# Patient Record
Sex: MALE | Employment: OTHER | URBAN - METROPOLITAN AREA
[De-identification: names, ages, dates, MRNs, and addresses within clinical notes are randomized per-mention and may not be internally consistent; named-entity substitution may affect disease eponyms.]

---

## 2022-11-14 ENCOUNTER — LAB ENCOUNTER (OUTPATIENT)
Dept: LAB | Facility: REFERENCE LAB | Age: 32
End: 2022-11-14
Attending: INTERNAL MEDICINE
Payer: COMMERCIAL

## 2022-11-14 ENCOUNTER — OFFICE VISIT (OUTPATIENT)
Dept: INTERNAL MEDICINE CLINIC | Facility: CLINIC | Age: 32
End: 2022-11-14
Payer: COMMERCIAL

## 2022-11-14 VITALS
SYSTOLIC BLOOD PRESSURE: 110 MMHG | BODY MASS INDEX: 41.28 KG/M2 | DIASTOLIC BLOOD PRESSURE: 60 MMHG | TEMPERATURE: 99 F | OXYGEN SATURATION: 98 % | WEIGHT: 263 LBS | HEIGHT: 66.75 IN | HEART RATE: 70 BPM

## 2022-11-14 DIAGNOSIS — G62.9 NEUROPATHY: ICD-10-CM

## 2022-11-14 DIAGNOSIS — Z00.00 ANNUAL PHYSICAL EXAM: Primary | ICD-10-CM

## 2022-11-14 DIAGNOSIS — Z11.59 NEED FOR HEPATITIS C SCREENING TEST: ICD-10-CM

## 2022-11-14 DIAGNOSIS — Z13.1 DIABETES MELLITUS SCREENING: ICD-10-CM

## 2022-11-14 LAB
ALBUMIN SERPL-MCNC: 3.8 G/DL (ref 3.4–5)
ALBUMIN/GLOB SERPL: 0.9 {RATIO} (ref 1–2)
ALP LIVER SERPL-CCNC: 93 U/L
ALT SERPL-CCNC: 36 U/L
ANION GAP SERPL CALC-SCNC: 9 MMOL/L (ref 0–18)
AST SERPL-CCNC: 14 U/L (ref 15–37)
BASOPHILS # BLD AUTO: 0.05 X10(3) UL (ref 0–0.2)
BASOPHILS NFR BLD AUTO: 0.5 %
BILIRUB SERPL-MCNC: 0.4 MG/DL (ref 0.1–2)
BUN BLD-MCNC: 16 MG/DL (ref 7–18)
BUN/CREAT SERPL: 15.7 (ref 10–20)
CALCIUM BLD-MCNC: 9.9 MG/DL (ref 8.5–10.1)
CHLORIDE SERPL-SCNC: 106 MMOL/L (ref 98–112)
CO2 SERPL-SCNC: 26 MMOL/L (ref 21–32)
CREAT BLD-MCNC: 1.02 MG/DL
DEPRECATED RDW RBC AUTO: 39.2 FL (ref 35.1–46.3)
EOSINOPHIL # BLD AUTO: 0.19 X10(3) UL (ref 0–0.7)
EOSINOPHIL NFR BLD AUTO: 2 %
ERYTHROCYTE [DISTWIDTH] IN BLOOD BY AUTOMATED COUNT: 11.9 % (ref 11–15)
EST. AVERAGE GLUCOSE BLD GHB EST-MCNC: 97 MG/DL (ref 68–126)
FASTING STATUS PATIENT QL REPORTED: NO
GFR SERPLBLD BASED ON 1.73 SQ M-ARVRAT: 101 ML/MIN/1.73M2 (ref 60–?)
GLOBULIN PLAS-MCNC: 4.3 G/DL (ref 2.8–4.4)
GLUCOSE BLD-MCNC: 89 MG/DL (ref 70–99)
HBA1C MFR BLD: 5 % (ref ?–5.7)
HCT VFR BLD AUTO: 44 %
HCV AB SERPL QL IA: NONREACTIVE
HGB BLD-MCNC: 14.7 G/DL
IMM GRANULOCYTES # BLD AUTO: 0.04 X10(3) UL (ref 0–1)
IMM GRANULOCYTES NFR BLD: 0.4 %
LYMPHOCYTES # BLD AUTO: 2.46 X10(3) UL (ref 1–4)
LYMPHOCYTES NFR BLD AUTO: 26.3 %
MCH RBC QN AUTO: 29.8 PG (ref 26–34)
MCHC RBC AUTO-ENTMCNC: 33.4 G/DL (ref 31–37)
MCV RBC AUTO: 89.2 FL
MONOCYTES # BLD AUTO: 0.65 X10(3) UL (ref 0.1–1)
MONOCYTES NFR BLD AUTO: 7 %
NEUTROPHILS # BLD AUTO: 5.95 X10 (3) UL (ref 1.5–7.7)
NEUTROPHILS # BLD AUTO: 5.95 X10(3) UL (ref 1.5–7.7)
NEUTROPHILS NFR BLD AUTO: 63.8 %
OSMOLALITY SERPL CALC.SUM OF ELEC: 293 MOSM/KG (ref 275–295)
PLATELET # BLD AUTO: 322 10(3)UL (ref 150–450)
POTASSIUM SERPL-SCNC: 4.1 MMOL/L (ref 3.5–5.1)
PROT SERPL-MCNC: 8.1 G/DL (ref 6.4–8.2)
RBC # BLD AUTO: 4.93 X10(6)UL
SODIUM SERPL-SCNC: 141 MMOL/L (ref 136–145)
T4 FREE SERPL-MCNC: 0.9 NG/DL (ref 0.8–1.7)
TSI SER-ACNC: 1.23 MIU/ML (ref 0.36–3.74)
WBC # BLD AUTO: 9.3 X10(3) UL (ref 4–11)

## 2022-11-14 PROCEDURE — 86803 HEPATITIS C AB TEST: CPT | Performed by: INTERNAL MEDICINE

## 2022-11-14 PROCEDURE — 80053 COMPREHEN METABOLIC PANEL: CPT | Performed by: INTERNAL MEDICINE

## 2022-11-14 PROCEDURE — 3074F SYST BP LT 130 MM HG: CPT | Performed by: INTERNAL MEDICINE

## 2022-11-14 PROCEDURE — 84443 ASSAY THYROID STIM HORMONE: CPT

## 2022-11-14 PROCEDURE — 36415 COLL VENOUS BLD VENIPUNCTURE: CPT | Performed by: INTERNAL MEDICINE

## 2022-11-14 PROCEDURE — 99385 PREV VISIT NEW AGE 18-39: CPT | Performed by: INTERNAL MEDICINE

## 2022-11-14 PROCEDURE — 84439 ASSAY OF FREE THYROXINE: CPT

## 2022-11-14 PROCEDURE — 3078F DIAST BP <80 MM HG: CPT | Performed by: INTERNAL MEDICINE

## 2022-11-14 PROCEDURE — 83036 HEMOGLOBIN GLYCOSYLATED A1C: CPT | Performed by: INTERNAL MEDICINE

## 2022-11-14 PROCEDURE — 3008F BODY MASS INDEX DOCD: CPT | Performed by: INTERNAL MEDICINE

## 2022-11-14 PROCEDURE — 85025 COMPLETE CBC W/AUTO DIFF WBC: CPT | Performed by: INTERNAL MEDICINE

## 2022-11-16 ENCOUNTER — LAB ENCOUNTER (OUTPATIENT)
Dept: LAB | Facility: HOSPITAL | Age: 32
End: 2022-11-16
Attending: INTERNAL MEDICINE
Payer: COMMERCIAL

## 2022-11-16 LAB
CHOLEST SERPL-MCNC: 190 MG/DL (ref ?–200)
FASTING PATIENT LIPID ANSWER: YES
HDLC SERPL-MCNC: 63 MG/DL (ref 40–59)
LDLC SERPL CALC-MCNC: 111 MG/DL (ref ?–100)
NONHDLC SERPL-MCNC: 127 MG/DL (ref ?–130)
TRIGL SERPL-MCNC: 91 MG/DL (ref 30–149)
VLDLC SERPL CALC-MCNC: 16 MG/DL (ref 0–30)

## 2022-11-16 PROCEDURE — 36415 COLL VENOUS BLD VENIPUNCTURE: CPT | Performed by: INTERNAL MEDICINE

## 2022-11-16 PROCEDURE — 80061 LIPID PANEL: CPT | Performed by: INTERNAL MEDICINE

## 2024-02-13 ENCOUNTER — OFFICE VISIT (OUTPATIENT)
Dept: INTERNAL MEDICINE CLINIC | Facility: CLINIC | Age: 34
End: 2024-02-13
Payer: COMMERCIAL

## 2024-02-13 VITALS
SYSTOLIC BLOOD PRESSURE: 110 MMHG | HEIGHT: 66 IN | OXYGEN SATURATION: 97 % | HEART RATE: 94 BPM | TEMPERATURE: 99 F | BODY MASS INDEX: 40.66 KG/M2 | DIASTOLIC BLOOD PRESSURE: 60 MMHG | WEIGHT: 253 LBS

## 2024-02-13 DIAGNOSIS — Z00.00 ANNUAL PHYSICAL EXAM: Primary | ICD-10-CM

## 2024-02-13 DIAGNOSIS — Z13.21 ENCOUNTER FOR VITAMIN DEFICIENCY SCREENING: ICD-10-CM

## 2024-02-13 DIAGNOSIS — R63.5 WEIGHT GAIN: ICD-10-CM

## 2024-02-13 PROCEDURE — 3078F DIAST BP <80 MM HG: CPT | Performed by: INTERNAL MEDICINE

## 2024-02-13 PROCEDURE — 99213 OFFICE O/P EST LOW 20 MIN: CPT | Performed by: INTERNAL MEDICINE

## 2024-02-13 PROCEDURE — 3008F BODY MASS INDEX DOCD: CPT | Performed by: INTERNAL MEDICINE

## 2024-02-13 PROCEDURE — 3074F SYST BP LT 130 MM HG: CPT | Performed by: INTERNAL MEDICINE

## 2024-02-13 PROCEDURE — 99395 PREV VISIT EST AGE 18-39: CPT | Performed by: INTERNAL MEDICINE

## 2024-02-13 RX ORDER — TIRZEPATIDE 2.5 MG/.5ML
2.5 INJECTION, SOLUTION SUBCUTANEOUS WEEKLY
Qty: 2 ML | Refills: 0 | Status: SHIPPED | OUTPATIENT
Start: 2024-02-13

## 2024-02-14 ENCOUNTER — LAB ENCOUNTER (OUTPATIENT)
Dept: LAB | Facility: HOSPITAL | Age: 34
End: 2024-02-14
Attending: INTERNAL MEDICINE
Payer: COMMERCIAL

## 2024-02-14 DIAGNOSIS — Z13.21 ENCOUNTER FOR VITAMIN DEFICIENCY SCREENING: ICD-10-CM

## 2024-02-14 LAB
ALBUMIN SERPL-MCNC: 4.7 G/DL (ref 3.2–4.8)
ALBUMIN/GLOB SERPL: 1.5 {RATIO} (ref 1–2)
ALP LIVER SERPL-CCNC: 88 U/L
ALT SERPL-CCNC: 19 U/L
ANION GAP SERPL CALC-SCNC: 8 MMOL/L (ref 0–18)
AST SERPL-CCNC: 20 U/L (ref ?–34)
BASOPHILS # BLD AUTO: 0.05 X10(3) UL (ref 0–0.2)
BASOPHILS NFR BLD AUTO: 0.5 %
BILIRUB SERPL-MCNC: 1.1 MG/DL (ref 0.3–1.2)
BUN BLD-MCNC: 14 MG/DL (ref 9–23)
BUN/CREAT SERPL: 13.9 (ref 10–20)
CALCIUM BLD-MCNC: 9.5 MG/DL (ref 8.7–10.4)
CHLORIDE SERPL-SCNC: 104 MMOL/L (ref 98–112)
CHOLEST SERPL-MCNC: 189 MG/DL (ref ?–200)
CO2 SERPL-SCNC: 26 MMOL/L (ref 21–32)
CREAT BLD-MCNC: 1.01 MG/DL
DEPRECATED RDW RBC AUTO: 38.6 FL (ref 35.1–46.3)
EGFRCR SERPLBLD CKD-EPI 2021: 102 ML/MIN/1.73M2 (ref 60–?)
EOSINOPHIL # BLD AUTO: 0.1 X10(3) UL (ref 0–0.7)
EOSINOPHIL NFR BLD AUTO: 1 %
ERYTHROCYTE [DISTWIDTH] IN BLOOD BY AUTOMATED COUNT: 11.9 % (ref 11–15)
EST. AVERAGE GLUCOSE BLD GHB EST-MCNC: 91 MG/DL (ref 68–126)
FASTING PATIENT LIPID ANSWER: YES
FASTING STATUS PATIENT QL REPORTED: YES
GLOBULIN PLAS-MCNC: 3.2 G/DL (ref 2.8–4.4)
GLUCOSE BLD-MCNC: 79 MG/DL (ref 70–99)
HBA1C MFR BLD: 4.8 % (ref ?–5.7)
HCT VFR BLD AUTO: 41.6 %
HDLC SERPL-MCNC: 66 MG/DL (ref 40–59)
HGB BLD-MCNC: 14.3 G/DL
IMM GRANULOCYTES # BLD AUTO: 0.04 X10(3) UL (ref 0–1)
IMM GRANULOCYTES NFR BLD: 0.4 %
LDLC SERPL CALC-MCNC: 107 MG/DL (ref ?–100)
LYMPHOCYTES # BLD AUTO: 2.44 X10(3) UL (ref 1–4)
LYMPHOCYTES NFR BLD AUTO: 25.2 %
MCH RBC QN AUTO: 30 PG (ref 26–34)
MCHC RBC AUTO-ENTMCNC: 34.4 G/DL (ref 31–37)
MCV RBC AUTO: 87.2 FL
MONOCYTES # BLD AUTO: 0.58 X10(3) UL (ref 0.1–1)
MONOCYTES NFR BLD AUTO: 6 %
NEUTROPHILS # BLD AUTO: 6.47 X10 (3) UL (ref 1.5–7.7)
NEUTROPHILS # BLD AUTO: 6.47 X10(3) UL (ref 1.5–7.7)
NEUTROPHILS NFR BLD AUTO: 66.9 %
NONHDLC SERPL-MCNC: 123 MG/DL (ref ?–130)
OSMOLALITY SERPL CALC.SUM OF ELEC: 285 MOSM/KG (ref 275–295)
PLATELET # BLD AUTO: 307 10(3)UL (ref 150–450)
POTASSIUM SERPL-SCNC: 3.8 MMOL/L (ref 3.5–5.1)
PROT SERPL-MCNC: 7.9 G/DL (ref 5.7–8.2)
RBC # BLD AUTO: 4.77 X10(6)UL
SODIUM SERPL-SCNC: 138 MMOL/L (ref 136–145)
TRIGL SERPL-MCNC: 87 MG/DL (ref 30–149)
TSI SER-ACNC: 1.79 MIU/ML (ref 0.55–4.78)
VIT D+METAB SERPL-MCNC: 7.7 NG/ML (ref 30–100)
VLDLC SERPL CALC-MCNC: 15 MG/DL (ref 0–30)
WBC # BLD AUTO: 9.7 X10(3) UL (ref 4–11)

## 2024-02-14 PROCEDURE — 36415 COLL VENOUS BLD VENIPUNCTURE: CPT | Performed by: INTERNAL MEDICINE

## 2024-02-14 PROCEDURE — 80053 COMPREHEN METABOLIC PANEL: CPT | Performed by: INTERNAL MEDICINE

## 2024-02-14 PROCEDURE — 80061 LIPID PANEL: CPT | Performed by: INTERNAL MEDICINE

## 2024-02-14 PROCEDURE — 85025 COMPLETE CBC W/AUTO DIFF WBC: CPT | Performed by: INTERNAL MEDICINE

## 2024-02-14 PROCEDURE — 82306 VITAMIN D 25 HYDROXY: CPT

## 2024-02-14 PROCEDURE — 83036 HEMOGLOBIN GLYCOSYLATED A1C: CPT | Performed by: INTERNAL MEDICINE

## 2024-02-14 PROCEDURE — 84443 ASSAY THYROID STIM HORMONE: CPT | Performed by: INTERNAL MEDICINE

## 2024-02-16 ENCOUNTER — TELEPHONE (OUTPATIENT)
Dept: INTERNAL MEDICINE CLINIC | Facility: CLINIC | Age: 34
End: 2024-02-16

## 2024-02-16 NOTE — TELEPHONE ENCOUNTER
Patient is calling stating the pharmacy told him that the medication Tirzepatide-Weight Management (ZEPBOUND) 2.5 MG/0.5ML Subcutaneous Solution Auto-injector  needs a prior authorization.       Please advice

## 2024-02-19 NOTE — TELEPHONE ENCOUNTER
PA started on cover my meds for zepbound. Key: [id number]. Case saved, not yet submitted.     Routed to MD - if pt has tried and failed anything treatment/meds for weight loss.

## 2024-02-20 ENCOUNTER — TELEPHONE (OUTPATIENT)
Dept: INTERNAL MEDICINE CLINIC | Facility: CLINIC | Age: 34
End: 2024-02-20

## 2024-02-20 NOTE — TELEPHONE ENCOUNTER
Fax received from [employer] re: Denial of Zepbound Rx for weight loss. Denial notice states this PPO plan does not cover weight loss drugs.     Denial routed to Dr Lowry.    ePantry message sent to patient to inform him that GLP-1 category drugs like Zepbound are not covered for weight loss or non-diabetics. His last A1C: 4.8%

## 2024-02-22 RX ORDER — VITAMIN B COMPLEX
1 TABLET ORAL DAILY
Qty: 90 TABLET | Refills: 1 | Status: SHIPPED | OUTPATIENT
Start: 2024-02-22 | End: 2024-03-23

## 2024-02-22 RX ORDER — PHENTERMINE HYDROCHLORIDE 37.5 MG/1
37.5 TABLET ORAL
Qty: 90 TABLET | Refills: 0 | Status: SHIPPED | OUTPATIENT
Start: 2024-02-22 | End: 2024-02-22 | Stop reason: CLARIF

## 2024-02-22 NOTE — PROGRESS NOTES
[facility]  Return Patient      HPI:     Chief Complaint   Patient presents with    Physical       Pavel PRAMOD Benavidez is a 33 year old male presenting for:  Follow up and annual.  e Has  has no past medical history on file.     He is here to discuss preventative work and weight gain.      He is working out via lifting weights but still feels that his weight is not decreasing.  Would like to discuss options.      Results for orders placed or performed in visit on 02/13/24   Lipid Panel [E]   Result Value Ref Range    Cholesterol, Total 189 <200 mg/dL    HDL Cholesterol 66 (H) 40 - 59 mg/dL    Triglycerides 87 30 - 149 mg/dL    LDL Cholesterol 107 (H) <100 mg/dL    VLDL 15 0 - 30 mg/dL    Non HDL Chol 123 <130 mg/dL    Patient Fasting for Lipid? Yes    Comp Metabolic Panel (14) [E]   Result Value Ref Range    Glucose 79 70 - 99 mg/dL    Sodium 138 136 - 145 mmol/L    Potassium 3.8 3.5 - 5.1 mmol/L    Chloride 104 98 - 112 mmol/L    CO2 26.0 21.0 - 32.0 mmol/L    Anion Gap 8 0 - 18 mmol/L    BUN 14 9 - 23 mg/dL    Creatinine 1.01 0.70 - 1.30 mg/dL    BUN/CREA Ratio 13.9 10.0 - 20.0    Calcium, Total 9.5 8.7 - 10.4 mg/dL    Calculated Osmolality 285 275 - 295 mOsm/kg    eGFR-Cr 102 >=60 mL/min/1.73m2    ALT 19 10 - 49 U/L    AST 20 <=34 U/L    Alkaline Phosphatase 88 45 - 117 U/L    Bilirubin, Total 1.1 0.3 - 1.2 mg/dL    Total Protein 7.9 5.7 - 8.2 g/dL    Albumin 4.7 3.2 - 4.8 g/dL    Globulin  3.2 2.8 - 4.4 g/dL    A/G Ratio 1.5 1.0 - 2.0    Patient Fasting for CMP? Yes    Hemoglobin A1C (Glycohemoglobin) [E]   Result Value Ref Range    HgbA1C 4.8 <5.7 %    Estimated Average Glucose 91 68 - 126 mg/dL   TSH W Reflex To Free T4 [E]   Result Value Ref Range    TSH 1.787 0.550 - 4.780 mIU/mL   CBC W/ DIFFERENTIAL   Result Value Ref Range    WBC 9.7 4.0 - 11.0 x10(3) uL    RBC 4.77 4.30 - 5.70 x10(6)uL    HGB 14.3 13.0 - 17.5 g/dL    HCT 41.6 39.0 - 53.0 %    MCV 87.2 80.0 - 100.0 fL    MCH 30.0 26.0 -  34.0 pg    MCHC 34.4 31.0 - 37.0 g/dL    RDW-SD 38.6 35.1 - 46.3 fL    RDW 11.9 11.0 - 15.0 %    .0 150.0 - 450.0 10(3)uL    Neutrophil Absolute Prelim 6.47 1.50 - 7.70 x10 (3) uL    Neutrophil Absolute 6.47 1.50 - 7.70 x10(3) uL    Lymphocyte Absolute 2.44 1.00 - 4.00 x10(3) uL    Monocyte Absolute 0.58 0.10 - 1.00 x10(3) uL    Eosinophil Absolute 0.10 0.00 - 0.70 x10(3) uL    Basophil Absolute 0.05 0.00 - 0.20 x10(3) uL    Immature Granulocyte Absolute 0.04 0.00 - 1.00 x10(3) uL    Neutrophil % 66.9 %    Lymphocyte % 25.2 %    Monocyte % 6.0 %    Eosinophil % 1.0 %    Basophil % 0.5 %    Immature Granulocyte % 0.4 %             Labs:   Complete Metabolic Panel:  Lab Results   Component Value Date/Time     02/14/2024 12:06 PM    K 3.8 02/14/2024 12:06 PM     02/14/2024 12:06 PM    CO2 26.0 02/14/2024 12:06 PM    CREATSERUM 1.01 02/14/2024 12:06 PM    CA 9.5 02/14/2024 12:06 PM    GLU 79 02/14/2024 12:06 PM    TP 7.9 02/14/2024 12:06 PM    ALB 4.7 02/14/2024 12:06 PM    ALKPHO 88 02/14/2024 12:06 PM    AST 20 02/14/2024 12:06 PM    ALT 19 02/14/2024 12:06 PM    BILT 1.1 02/14/2024 12:06 PM    TSH 1.787 02/14/2024 12:06 PM    T4F 0.9 11/14/2022 02:28 PM        Hemoglobin A1C, Microalbumin  Lab Results   Component Value Date/Time    A1C 4.8 02/14/2024 12:06 PM        Lipid panel  Lab Results   Component Value Date/Time    CHOLEST 189 02/14/2024 12:06 PM    HDL 66 (H) 02/14/2024 12:06 PM    TRIG 87 02/14/2024 12:06 PM     (H) 02/14/2024 12:06 PM    NONHDLC 123 02/14/2024 12:06 PM     The ASCVD Risk score (Sury ABARCA, et al., 2019) failed to calculate for the following reasons:    The 2019 ASCVD risk score is only valid for ages 40 to 79       Medications:  Current Outpatient Medications   Medication Sig Dispense Refill    Tirzepatide-Weight Management (ZEPBOUND) 2.5 MG/0.5ML Subcutaneous Solution Auto-injector Inject 2.5 mg into the skin once a week. 2 mL 0      PMH:  History reviewed. No  pertinent past medical history.      PSH:  History reviewed. No pertinent surgical history.    Allergies:  No Known Allergies   Social History:  Social History     Socioeconomic History    Marital status:    Tobacco Use    Smoking status: Never    Smokeless tobacco: Never   Substance and Sexual Activity    Alcohol use: Yes     Comment: social    Drug use: Never        Family History:  History reviewed. No pertinent family history.       REVIEW OF SYSTEMS:   Review of Systems   Constitutional:  Negative for chills, fatigue, fever and unexpected weight change.   HENT:  Negative for congestion, ear pain, hearing loss, rhinorrhea, sinus pain and sore throat.    Eyes:  Negative for pain, redness and visual disturbance.   Respiratory:  Negative for apnea, cough, chest tightness, shortness of breath and wheezing.    Cardiovascular:  Negative for chest pain, palpitations and leg swelling.   Gastrointestinal:  Negative for abdominal distention, abdominal pain, blood in stool, constipation and nausea.   Endocrine: Negative for cold intolerance, heat intolerance and polyuria.   Genitourinary:  Negative for dysuria, hematuria and urgency.   Musculoskeletal:  Negative for arthralgias, back pain, gait problem, joint swelling, myalgias and neck pain.   Skin:  Negative for rash and wound.   Allergic/Immunologic: Negative for food allergies and immunocompromised state.   Neurological:  Negative for dizziness, seizures, facial asymmetry, speech difficulty, weakness, light-headedness, numbness and headaches.   Hematological:  Negative for adenopathy. Does not bruise/bleed easily.   Psychiatric/Behavioral:  Negative for behavioral problems, sleep disturbance and suicidal ideas. The patient is not nervous/anxious.             PHYSICAL EXAM:   /60 (BP Location: Right arm, Patient Position: Sitting, Cuff Size: adult)   Pulse 94   Temp 98.6 °F (37 °C) (Temporal)   Ht 5' 6\" (1.676 m)   Wt 253 lb (114.8 kg)   SpO2 97%    BMI 40.84 kg/m²  Estimated body mass index is 40.84 kg/m² as calculated from the following:    Height as of this encounter: 5' 6\" (1.676 m).    Weight as of this encounter: 253 lb (114.8 kg).     Wt Readings from Last 3 Encounters:   02/13/24 253 lb (114.8 kg)   11/14/22 263 lb (119.3 kg)       Physical Exam  Vitals reviewed.   Constitutional:       General: He is not in acute distress.     Appearance: He is well-developed.   HENT:      Head: Normocephalic and atraumatic.   Eyes:      Conjunctiva/sclera: Conjunctivae normal.      Pupils: Pupils are equal, round, and reactive to light.   Neck:      Thyroid: No thyromegaly.   Cardiovascular:      Rate and Rhythm: Normal rate and regular rhythm.      Heart sounds: Normal heart sounds, S1 normal and S2 normal. No murmur heard.     No friction rub. No gallop.   Pulmonary:      Effort: Pulmonary effort is normal. No respiratory distress.      Breath sounds: Normal breath sounds. No wheezing or rales.   Chest:      Chest wall: No tenderness.   Abdominal:      General: Bowel sounds are normal. There is no distension.      Palpations: Abdomen is soft. There is no mass.      Tenderness: There is no abdominal tenderness. There is no guarding or rebound.   Musculoskeletal:         General: No tenderness. Normal range of motion.      Cervical back: Normal range of motion.   Lymphadenopathy:      Cervical: No cervical adenopathy.   Skin:     General: Skin is warm.      Findings: No erythema or rash.   Neurological:      Mental Status: He is alert and oriented to person, place, and time.      Cranial Nerves: No cranial nerve deficit.      Deep Tendon Reflexes: Reflexes are normal and symmetric.   Psychiatric:         Behavior: Behavior normal.         Thought Content: Thought content normal.         Judgment: Judgment normal.             ASSESSMENT AND PLAN:   Patient is a 33 year old male who presents primarily presents for:    (Z00.00) Annual physical exam  (primary encounter  diagnosis)  Plan: Lipid Panel [E], Comp Metabolic Panel (14) [E],        CBC With Differential With Platelet          During the annual physical exam I spent extensive time discussing medical history including existing conditions, past surgeries, allergies and medications.  Lifestyle factors discussed including diet, exercise and substance abuse including alcohol and tobacco if warranted.  Counseled on screening tests based on age and underlying risk factors which may include but not limited to blood pressure measurement, cholesterol screening, diabetes screening and prostate cancer screening.  Preventative screenings discussed.  Reviewed immunizations.  Sexual health discussed if appropriate.  Family history reviewed.  I addressed any specific concerns or symptoms that the patient had.        (R63.5) Weight gain  Plan: Hemoglobin A1C (Glycohemoglobin) [E], TSH W         Reflex To Free T4 [E],    Discussed options.  Counseled on diet.  Will try to see if tirzepatide is covered.              (Z13.21) Encounter for vitamin deficiency screening  Plan: Vitamin D [E]                        Health Maintenance:  Annual Depression Screen Never done  Annual Physical Never done  COVID-19 Vaccine(1) Never done  DTaP,Tdap,and Td Vaccines(1 - Tdap) Never done  Influenza Vaccine(1) Never done  Pneumococcal Vaccine: Birth to 64yrs Aged Out          Meds & Refills for this Visit:  Requested Prescriptions     Signed Prescriptions Disp Refills    Tirzepatide-Weight Management (ZEPBOUND) 2.5 MG/0.5ML Subcutaneous Solution Auto-injector 2 mL 0     Sig: Inject 2.5 mg into the skin once a week.       Orders Placed This Encounter   Procedures    Lipid Panel [E]    Comp Metabolic Panel (14) [E]    CBC With Differential With Platelet    Hemoglobin A1C (Glycohemoglobin) [E]    TSH W Reflex To Free T4 [E]    Vitamin D [E]       Imaging & Consults:  None        Bernard Lowry MD     Return in about 3 months (around 5/13/2024) for Weight  Monitoring.  Important issues to follow up on next visit      Patient indicates understanding of the above recommendations and agrees to the above plan.  Reasurrance and education provided. All questions answered.  Notified to call with any questions, complications, allergies, or worsening or changing symptoms as well as any side effects or complications from the treatments .  Red flags/ ER precautions discussed.    Total time spent was 25 minutes in addition to annual physical addressing weight loss concerns as noted above.       Bernard Lowry MD  Internal Medicine/Primary Care  [facility]

## 2024-05-09 ENCOUNTER — OFFICE VISIT (OUTPATIENT)
Dept: INTERNAL MEDICINE CLINIC | Facility: CLINIC | Age: 34
End: 2024-05-09

## 2024-05-09 VITALS
DIASTOLIC BLOOD PRESSURE: 78 MMHG | WEIGHT: 228.81 LBS | SYSTOLIC BLOOD PRESSURE: 114 MMHG | BODY MASS INDEX: 36.77 KG/M2 | HEIGHT: 66 IN | HEART RATE: 82 BPM | TEMPERATURE: 97 F | OXYGEN SATURATION: 99 %

## 2024-05-09 DIAGNOSIS — Z71.3 WEIGHT LOSS COUNSELING, ENCOUNTER FOR: Primary | ICD-10-CM

## 2024-05-09 PROCEDURE — 99214 OFFICE O/P EST MOD 30 MIN: CPT | Performed by: INTERNAL MEDICINE

## 2024-05-09 PROCEDURE — 3008F BODY MASS INDEX DOCD: CPT | Performed by: INTERNAL MEDICINE

## 2024-05-09 PROCEDURE — 3074F SYST BP LT 130 MM HG: CPT | Performed by: INTERNAL MEDICINE

## 2024-05-09 PROCEDURE — 3078F DIAST BP <80 MM HG: CPT | Performed by: INTERNAL MEDICINE

## 2024-05-09 RX ORDER — PHENTERMINE HYDROCHLORIDE 37.5 MG/1
TABLET ORAL
COMMUNITY
Start: 2024-02-22

## 2024-05-09 RX ORDER — PHENTERMINE HYDROCHLORIDE 37.5 MG/1
37.5 TABLET ORAL
Qty: 90 TABLET | Refills: 1 | Status: SHIPPED | OUTPATIENT
Start: 2024-05-09

## 2024-05-20 NOTE — PROGRESS NOTES
[facility]  Return Patient      HPI:     Chief Complaint   Patient presents with    Follow - Up    Allied Health Visit       Pavel Benavidez is a 33 year old male presenting for:  Follow up and annual.  e Has  has no past medical history on file.     He is here to discuss preventative work and weight gain.      Continues on phentermine.  Overall is feeling well.          Results for orders placed or performed in visit on 02/14/24   Vitamin D, 25-Hydroxy   Result Value Ref Range    Vitamin D, 25OH, Total 7.7 (L) 30.0 - 100.0 ng/mL             Labs:   Complete Metabolic Panel:  Lab Results   Component Value Date/Time     02/14/2024 12:06 PM    K 3.8 02/14/2024 12:06 PM     02/14/2024 12:06 PM    CO2 26.0 02/14/2024 12:06 PM    CREATSERUM 1.01 02/14/2024 12:06 PM    CA 9.5 02/14/2024 12:06 PM    GLU 79 02/14/2024 12:06 PM    TP 7.9 02/14/2024 12:06 PM    ALB 4.7 02/14/2024 12:06 PM    ALKPHO 88 02/14/2024 12:06 PM    AST 20 02/14/2024 12:06 PM    ALT 19 02/14/2024 12:06 PM    BILT 1.1 02/14/2024 12:06 PM    TSH 1.787 02/14/2024 12:06 PM    T4F 0.9 11/14/2022 02:28 PM        Hemoglobin A1C, Microalbumin  Lab Results   Component Value Date/Time    A1C 4.8 02/14/2024 12:06 PM        Lipid panel  Lab Results   Component Value Date/Time    CHOLEST 189 02/14/2024 12:06 PM    HDL 66 (H) 02/14/2024 12:06 PM    TRIG 87 02/14/2024 12:06 PM     (H) 02/14/2024 12:06 PM    NONHDLC 123 02/14/2024 12:06 PM     The ASCVD Risk score (Sury ABARCA, et al., 2019) failed to calculate for the following reasons:    The 2019 ASCVD risk score is only valid for ages 40 to 79       Medications:  Current Outpatient Medications   Medication Sig Dispense Refill    Phentermine HCl 37.5 MG Oral Tab       Phentermine HCl 37.5 MG Oral Tab Take 1 tablet (37.5 mg total) by mouth every morning before breakfast. 90 tablet 1      PMH:  History reviewed. No pertinent past medical history.      PSH:  History reviewed. No  pertinent surgical history.    Allergies:  No Known Allergies   Social History:  Social History     Socioeconomic History    Marital status:    Tobacco Use    Smoking status: Never    Smokeless tobacco: Never   Substance and Sexual Activity    Alcohol use: Yes     Comment: social    Drug use: Never        Family History:  History reviewed. No pertinent family history.       REVIEW OF SYSTEMS:   Review of Systems   Constitutional:  Negative for chills, fatigue, fever and unexpected weight change.   HENT:  Negative for congestion, ear pain, hearing loss, rhinorrhea, sinus pain and sore throat.    Eyes:  Negative for pain, redness and visual disturbance.   Respiratory:  Negative for apnea, cough, chest tightness, shortness of breath and wheezing.    Cardiovascular:  Negative for chest pain, palpitations and leg swelling.   Gastrointestinal:  Negative for abdominal distention, abdominal pain, blood in stool, constipation and nausea.   Endocrine: Negative for cold intolerance, heat intolerance and polyuria.   Genitourinary:  Negative for dysuria, hematuria and urgency.   Musculoskeletal:  Negative for arthralgias, back pain, gait problem, joint swelling, myalgias and neck pain.   Skin:  Negative for rash and wound.   Allergic/Immunologic: Negative for food allergies and immunocompromised state.   Neurological:  Negative for dizziness, seizures, facial asymmetry, speech difficulty, weakness, light-headedness, numbness and headaches.   Hematological:  Negative for adenopathy. Does not bruise/bleed easily.   Psychiatric/Behavioral:  Negative for behavioral problems, sleep disturbance and suicidal ideas. The patient is not nervous/anxious.             PHYSICAL EXAM:   /78   Pulse 82   Temp 97.2 °F (36.2 °C)   Ht 5' 6\" (1.676 m)   Wt 228 lb 12.8 oz (103.8 kg)   SpO2 99%   BMI 36.93 kg/m²  Estimated body mass index is 36.93 kg/m² as calculated from the following:    Height as of this encounter: 5' 6\" (1.676  m).    Weight as of this encounter: 228 lb 12.8 oz (103.8 kg).     Wt Readings from Last 3 Encounters:   05/09/24 228 lb 12.8 oz (103.8 kg)   02/13/24 253 lb (114.8 kg)   11/14/22 263 lb (119.3 kg)       Physical Exam  Vitals reviewed.   Constitutional:       General: He is not in acute distress.     Appearance: He is well-developed.   HENT:      Head: Normocephalic and atraumatic.   Eyes:      Conjunctiva/sclera: Conjunctivae normal.      Pupils: Pupils are equal, round, and reactive to light.   Neck:      Thyroid: No thyromegaly.   Cardiovascular:      Rate and Rhythm: Normal rate and regular rhythm.      Heart sounds: Normal heart sounds, S1 normal and S2 normal. No murmur heard.     No friction rub. No gallop.   Pulmonary:      Effort: Pulmonary effort is normal. No respiratory distress.      Breath sounds: Normal breath sounds. No wheezing or rales.   Chest:      Chest wall: No tenderness.   Abdominal:      General: Bowel sounds are normal. There is no distension.      Palpations: Abdomen is soft. There is no mass.      Tenderness: There is no abdominal tenderness. There is no guarding or rebound.   Musculoskeletal:         General: No tenderness. Normal range of motion.      Cervical back: Normal range of motion.   Lymphadenopathy:      Cervical: No cervical adenopathy.   Skin:     General: Skin is warm.      Findings: No erythema or rash.   Neurological:      Mental Status: He is alert and oriented to person, place, and time.      Cranial Nerves: No cranial nerve deficit.      Deep Tendon Reflexes: Reflexes are normal and symmetric.   Psychiatric:         Behavior: Behavior normal.         Thought Content: Thought content normal.         Judgment: Judgment normal.             ASSESSMENT AND PLAN:   Patient is a 33 year old male who presents primarily presents for:    (Z71.3) Weight loss counseling, encounter for  (primary encounter diagnosis)  Plan: Phentermine HCl 37.5 MG Oral Tab        Discussed diet.   Continue on phentermine.                      Health Maintenance:  Annual Depression Screen Never done  Annual Physical Never done  COVID-19 Vaccine(1) Never done  DTaP,Tdap,and Td Vaccines(1 - Tdap) Never done  Influenza Vaccine(1) Never done  Pneumococcal Vaccine: Birth to 64yrs Aged Out          Meds & Refills for this Visit:  Requested Prescriptions     Signed Prescriptions Disp Refills    Phentermine HCl 37.5 MG Oral Tab 90 tablet 1     Sig: Take 1 tablet (37.5 mg total) by mouth every morning before breakfast.       No orders of the defined types were placed in this encounter.      Imaging & Consults:  None        Bernard Lowry MD     Return in about 6 months (around 11/9/2024) for Symptom monitoring.  Important issues to follow up on next visit      Patient indicates understanding of the above recommendations and agrees to the above plan.  Reasurrance and education provided. All questions answered.  Notified to call with any questions, complications, allergies, or worsening or changing symptoms as well as any side effects or complications from the treatments .  Red flags/ ER precautions discussed.    Total time spent was 25 minutes in addition to annual physical addressing weight loss concerns as noted above.       Bernard Lowry MD  Internal Medicine/Primary Care  [facility]

## 2024-09-16 ENCOUNTER — OFFICE VISIT (OUTPATIENT)
Dept: INTERNAL MEDICINE CLINIC | Facility: CLINIC | Age: 34
End: 2024-09-16
Payer: COMMERCIAL

## 2024-09-16 VITALS — BODY MASS INDEX: 36.96 KG/M2 | OXYGEN SATURATION: 96 % | WEIGHT: 230 LBS | HEIGHT: 66 IN | HEART RATE: 111 BPM

## 2024-09-16 PROCEDURE — 99214 OFFICE O/P EST MOD 30 MIN: CPT | Performed by: INTERNAL MEDICINE

## 2024-09-16 PROCEDURE — 3008F BODY MASS INDEX DOCD: CPT | Performed by: INTERNAL MEDICINE

## 2024-09-16 RX ORDER — TOPIRAMATE 25 MG/1
25 TABLET, FILM COATED ORAL 2 TIMES DAILY
Qty: 60 TABLET | Refills: 0 | Status: CANCELLED | OUTPATIENT
Start: 2024-09-16

## 2024-09-16 RX ORDER — VITAMIN B COMPLEX
1 TABLET ORAL DAILY
Qty: 90 TABLET | Refills: 1 | Status: SHIPPED | OUTPATIENT
Start: 2024-09-16 | End: 2024-10-16

## 2024-09-16 RX ORDER — MULTIVIT-MIN/IRON/FOLIC ACID/K 18-600-40
25 CAPSULE ORAL DAILY
Qty: 60 TABLET | Refills: 0 | Status: CANCELLED | OUTPATIENT
Start: 2024-09-16 | End: 2024-10-16

## 2024-09-16 NOTE — PROGRESS NOTES
[facility]  Return Patient      HPI:     Chief Complaint   Patient presents with    Weight Check     Pavel Benavidez is a 33 year old male presenting for:  Follow up. Has  has no past medical history on file.     Last office visit on 5/9 for weight loss counseling.   He is here to discuss preventative work and weight gain.  Remains on Phentermine and reports that it is not helping anymore.  Had lost over 20 pounds between 2/2024 and 5/2024. However, in the last 3-4 months, weight loss has stalled, previously 228 on 5/9, now 230. Has been logging weight once/week. Goes to the gym 4x/weekly (cardio with 1 mile at least and occasional weight lifting), reports that he has been eating well, mainly home-cooked, avoids grease. Typical meal includes vegetables, meat, and a side - eats twice daily. Denies overt cravings or binging. Reports an anti-oxidant smoothie has been helping. Seldomly snacks or has a dessert item, but nothing like he used to. Drinks water and pre-workout, no sugary drinks. Denies any issues with Phentermine, other than feeling its effects wearing down.     Mentioned his pants do feel a bit looser now.     Did run out of Vitamin D pills.     Results for orders placed or performed in visit on 02/14/24   Vitamin D, 25-Hydroxy   Result Value Ref Range    Vitamin D, 25OH, Total 7.7 (L) 30.0 - 100.0 ng/mL     Labs:   Complete Metabolic Panel:  Lab Results   Component Value Date/Time     02/14/2024 12:06 PM    K 3.8 02/14/2024 12:06 PM     02/14/2024 12:06 PM    CO2 26.0 02/14/2024 12:06 PM    CREATSERUM 1.01 02/14/2024 12:06 PM    CA 9.5 02/14/2024 12:06 PM    GLU 79 02/14/2024 12:06 PM    TP 7.9 02/14/2024 12:06 PM    ALB 4.7 02/14/2024 12:06 PM    ALKPHO 88 02/14/2024 12:06 PM    AST 20 02/14/2024 12:06 PM    ALT 19 02/14/2024 12:06 PM    BILT 1.1 02/14/2024 12:06 PM    TSH 1.787 02/14/2024 12:06 PM    T4F 0.9 11/14/2022 02:28 PM        Hemoglobin A1C, Microalbumin  Lab  Results   Component Value Date/Time    A1C 4.8 02/14/2024 12:06 PM        Lipid panel  Lab Results   Component Value Date/Time    CHOLEST 189 02/14/2024 12:06 PM    HDL 66 (H) 02/14/2024 12:06 PM    TRIG 87 02/14/2024 12:06 PM     (H) 02/14/2024 12:06 PM    NONHDLC 123 02/14/2024 12:06 PM     The ASCVD Risk score (Sury DK, et al., 2019) failed to calculate for the following reasons:    The 2019 ASCVD risk score is only valid for ages 40 to 79       Medications:  Current Outpatient Medications   Medication Sig Dispense Refill    semaglutide-weight management 0.25 MG/0.5ML Subcutaneous Solution Auto-injector Inject 0.5 mL (0.25 mg total) into the skin once a week for 4 doses. 2 mL 0    [START ON 10/16/2024] semaglutide-weight management 0.5 MG/0.5ML Subcutaneous Solution Auto-injector Inject 0.5 mL (0.5 mg total) into the skin once a week for 4 doses. 2 mL 0    Cholecalciferol (VITAMIN D) 25 MCG (1000 UT) Oral Tab Take 1 tablet by mouth daily. 90 tablet 1    Phentermine HCl 37.5 MG Oral Tab Take 1 tablet (37.5 mg total) by mouth every morning before breakfast. 90 tablet 1      PMH:  History reviewed. No pertinent past medical history.      PSH:  History reviewed. No pertinent surgical history.    Allergies:  No Known Allergies   Social History:  Social History     Socioeconomic History    Marital status:    Tobacco Use    Smoking status: Never    Smokeless tobacco: Never   Substance and Sexual Activity    Alcohol use: Yes     Comment: social    Drug use: Never        Family History:  History reviewed. No pertinent family history.       REVIEW OF SYSTEMS:   Review of Systems   Constitutional:  Negative for chills, fatigue, fever and unexpected weight change.   HENT:  Negative for congestion, ear pain, hearing loss, rhinorrhea, sinus pain and sore throat.    Eyes:  Negative for pain, redness and visual disturbance.   Respiratory:  Negative for apnea, cough, chest tightness, shortness of breath and  wheezing.    Cardiovascular:  Negative for chest pain, palpitations and leg swelling.   Gastrointestinal:  Negative for abdominal distention, abdominal pain, blood in stool, constipation and nausea.   Endocrine: Negative for cold intolerance, heat intolerance and polyuria.   Genitourinary:  Negative for dysuria, hematuria and urgency.   Musculoskeletal:  Negative for arthralgias, back pain, gait problem, joint swelling, myalgias and neck pain.   Skin:  Negative for rash and wound.   Allergic/Immunologic: Negative for food allergies and immunocompromised state.   Neurological:  Negative for dizziness, seizures, facial asymmetry, speech difficulty, weakness, light-headedness, numbness and headaches.   Hematological:  Negative for adenopathy. Does not bruise/bleed easily.   Psychiatric/Behavioral:  Negative for behavioral problems, sleep disturbance and suicidal ideas. The patient is not nervous/anxious.         PHYSICAL EXAM:   Pulse 111   Ht 5' 6\" (1.676 m)   Wt 230 lb (104.3 kg)   SpO2 96%   BMI 37.12 kg/m²  Estimated body mass index is 37.12 kg/m² as calculated from the following:    Height as of this encounter: 5' 6\" (1.676 m).    Weight as of this encounter: 230 lb (104.3 kg).     Wt Readings from Last 3 Encounters:   09/16/24 230 lb (104.3 kg)   05/09/24 228 lb 12.8 oz (103.8 kg)   02/13/24 253 lb (114.8 kg)       Physical Exam  Vitals reviewed.   Constitutional:       General: He is not in acute distress.     Appearance: He is well-developed.   HENT:      Head: Normocephalic and atraumatic.   Eyes:      Conjunctiva/sclera: Conjunctivae normal.      Pupils: Pupils are equal, round, and reactive to light.   Neck:      Thyroid: No thyromegaly.   Cardiovascular:      Rate and Rhythm: Normal rate and regular rhythm.      Heart sounds: Normal heart sounds, S1 normal and S2 normal. No murmur heard.     No friction rub. No gallop.   Pulmonary:      Effort: Pulmonary effort is normal. No respiratory distress.       Breath sounds: Normal breath sounds. No wheezing or rales.   Chest:      Chest wall: No tenderness.   Abdominal:      General: Bowel sounds are normal. There is no distension.      Palpations: Abdomen is soft. There is no mass.      Tenderness: There is no abdominal tenderness. There is no guarding or rebound.   Musculoskeletal:         General: No tenderness. Normal range of motion.      Cervical back: Normal range of motion.   Lymphadenopathy:      Cervical: No cervical adenopathy.   Skin:     General: Skin is warm.      Findings: No erythema or rash.   Neurological:      Mental Status: He is alert and oriented to person, place, and time.      Cranial Nerves: No cranial nerve deficit.      Deep Tendon Reflexes: Reflexes are normal and symmetric.   Psychiatric:         Behavior: Behavior normal.         Thought Content: Thought content normal.         Judgment: Judgment normal.         ASSESSMENT AND PLAN:   Patient is a 33 year old male who presents primarily presents for:    (Z71.3) Weight loss counseling, encounter for  (primary encounter diagnosis)  Struggle with weight loss.  Would like to trial semaglutide.  Will prescribe.     Health Maintenance:  Annual Depression Screen Never done  Annual Physical Never done  COVID-19 Vaccine(1) Never done  DTaP,Tdap,and Td Vaccines(1 - Tdap) Never done  Influenza Vaccine(1) Never done  Pneumococcal Vaccine: Birth to 64yrs Aged Out    Meds & Refills for this Visit:  Requested Prescriptions     Signed Prescriptions Disp Refills    semaglutide-weight management 0.25 MG/0.5ML Subcutaneous Solution Auto-injector 2 mL 0     Sig: Inject 0.5 mL (0.25 mg total) into the skin once a week for 4 doses.    semaglutide-weight management 0.5 MG/0.5ML Subcutaneous Solution Auto-injector 2 mL 0     Sig: Inject 0.5 mL (0.5 mg total) into the skin once a week for 4 doses.    Cholecalciferol (VITAMIN D) 25 MCG (1000 UT) Oral Tab 90 tablet 1     Sig: Take 1 tablet by mouth daily.       No  orders of the defined types were placed in this encounter.      Imaging & Consults:    BARIATRICS - INTERNAL    Did try Phentermine with some initial relief and stringent lifestyle modifications, however now having limited improvement with Phentermine. Will try Wegovy at a small dose to start. Referral given for Bariatric clinic. Continue Vit D 1,000U supplementation.     Bernard Lowry MD     No follow-ups on file.  Important issues to follow up on next visit    Patient indicates understanding of the above recommendations and agrees to the above plan.  Reasurrance and education provided. All questions answered.  Notified to call with any questions, complications, allergies, or worsening or changing symptoms as well as any side effects or complications from the treatments .  Red flags/ ER precautions discussed.    Total time spent was 25 minutes in addition to annual physical addressing weight loss concerns as noted above.       Bernard Lowry MD  Internal Medicine/Primary Care  [facility]

## 2024-09-18 ENCOUNTER — TELEPHONE (OUTPATIENT)
Dept: INTERNAL MEDICINE CLINIC | Facility: CLINIC | Age: 34
End: 2024-09-18

## 2024-09-18 NOTE — TELEPHONE ENCOUNTER
Fax received from [employer] requesting a PA for Wegovy.  PA completed and faxed to [facility].    Await PA decision response.

## 2024-09-19 ENCOUNTER — TELEPHONE (OUTPATIENT)
Dept: INTERNAL MEDICINE CLINIC | Facility: CLINIC | Age: 34
End: 2024-09-19

## 2024-09-19 NOTE — TELEPHONE ENCOUNTER
Faxed notification received from [employer] # -[id number] Wegovy has been denied. It is not covered under under patient Pharmacy plan.   Spoke with patient made him aware. He states that he will call his insurance to see what is approved.